# Patient Record
Sex: FEMALE | Race: WHITE | URBAN - METROPOLITAN AREA
[De-identification: names, ages, dates, MRNs, and addresses within clinical notes are randomized per-mention and may not be internally consistent; named-entity substitution may affect disease eponyms.]

---

## 2017-05-31 ENCOUNTER — OFFICE VISIT (OUTPATIENT)
Dept: URGENT CARE | Facility: URGENT CARE | Age: 52
End: 2017-05-31
Payer: COMMERCIAL

## 2017-05-31 VITALS
OXYGEN SATURATION: 94 % | DIASTOLIC BLOOD PRESSURE: 73 MMHG | WEIGHT: 120 LBS | TEMPERATURE: 98.1 F | HEART RATE: 57 BPM | SYSTOLIC BLOOD PRESSURE: 106 MMHG

## 2017-05-31 DIAGNOSIS — R07.0 THROAT PAIN: Primary | ICD-10-CM

## 2017-05-31 LAB
DEPRECATED S PYO AG THROAT QL EIA: NORMAL
MICRO REPORT STATUS: NORMAL
SPECIMEN SOURCE: NORMAL

## 2017-05-31 PROCEDURE — 87081 CULTURE SCREEN ONLY: CPT | Performed by: NURSE PRACTITIONER

## 2017-05-31 PROCEDURE — 99213 OFFICE O/P EST LOW 20 MIN: CPT | Performed by: NURSE PRACTITIONER

## 2017-05-31 PROCEDURE — 87880 STREP A ASSAY W/OPTIC: CPT | Performed by: NURSE PRACTITIONER

## 2017-05-31 NOTE — NURSING NOTE
Chief Complaint   Patient presents with     Pharyngitis     yesterday       Initial /73 (BP Location: Right arm, Patient Position: Chair, Cuff Size: Adult Regular)  Pulse 57  Temp 98.1  F (36.7  C) (Oral)  Wt 120 lb (54.4 kg)  SpO2 94% There is no height or weight on file to calculate BMI.  Medication Reconciliation: eitan Kimble, CMA

## 2017-05-31 NOTE — PROGRESS NOTES
SUBJECTIVE:                                                    Lisa Blevins is a 52 year old female who presents to clinic today for the following health issues:      RESPIRATORY SYMPTOMS      Duration: yesterday    Description  nasal congestion, rhinorrhea, sore throat, cough, chills, headache, fatigue/malaise, hoarse voice and myalgias    Severity: moderate    Accompanying signs and symptoms: None    History (predisposing factors):  none    Precipitating or alleviating factors: None    Therapies tried and outcome:  rest and fluids           Allergies   Allergen Reactions     Latex Swelling       No past medical history on file.      No current outpatient prescriptions on file prior to visit.  No current facility-administered medications on file prior to visit.     Social History   Substance Use Topics     Smoking status: Never Smoker     Smokeless tobacco: Not on file     Alcohol use Not on file       ROS:  Consitutional: As above  ENT: As above  Respiratory: As above    OBJECTIVE:  /73 (BP Location: Right arm, Patient Position: Chair, Cuff Size: Adult Regular)  Pulse 57  Temp 98.1  F (36.7  C) (Oral)  Wt 120 lb (54.4 kg)  SpO2 94%  GENERAL APPEARANCE: healthy, alert and no distress  EYES: conjunctiva clear  EARS:small cerumen.   Ear canals w/o erythema, TM's intact w/o erythema.    NOSE/MOUTH: Nose and mouth without ulcers, erythema or lesions  THROAT: mild erythema with slight tonsillar enlargement . And no exudates  NECK: supple, nontender, no lymphadenopathy  RESP: lungs clear to auscultation - no rales, rhonchi or wheezes  CV: regular rates and rhythm, normal S1 S2, no murmur noted  NEURO: awake, alert        Rapid Strep test: Negative    ASSESSMENT:     ICD-10-CM    1. Throat pain R07.0 Strep, Rapid Screen       PLAN:  I discussed lab results with the patient.  Lots of rest and fluids.  RTC if any worsening symptoms or if not improving.    Margarita Owen FNP-BC

## 2017-05-31 NOTE — MR AVS SNAPSHOT
"              After Visit Summary   2017    Lisa Blevins    MRN: [id number]           Patient Information     Date Of Birth          1965        Visit Information        Provider Department      2017 6:25 PM Margarita Owen NP [facility]        Today's Diagnoses     Throat pain    -  1       Follow-ups after your visit        Follow-up notes from your care team     Return if symptoms worsen or fail to improve.      Who to contact     If you have questions or need follow up information about today's clinic visit or your schedule please contact [facility] directly at [phone].  Normal or non-critical lab and imaging results will be communicated to you by Yoolinkhart, letter or phone within 4 business days after the clinic has received the results. If you do not hear from us within 7 days, please contact the clinic through Yoolinkhart or phone. If you have a critical or abnormal lab result, we will notify you by phone as soon as possible.  Submit refill requests through Indeed or call your pharmacy and they will forward the refill request to us. Please allow 3 business days for your refill to be completed.          Additional Information About Your Visit        MyChart Information     Indeed lets you send messages to your doctor, view your test results, renew your prescriptions, schedule appointments and more. To sign up, go to [email or web address] . Click on \"Log in\" on the left side of the screen, which will take you to the Welcome page. Then click on \"Sign up Now\" on the right side of the page.     You will be asked to enter the access code listed below, as well as some personal information. Please follow the directions to create your username and password.     Your access code is: [email or web address]  Expires: 2017  7:45 PM     Your access code will  in 90 days. If you need help or a new code, please call your [facility] or [phone].        Care " EveryWhere ID     This is your Care EveryWhere ID. This could be used by other organizations to access your [place] medical records  [id number]        Your Vitals Were     Pulse Temperature Pulse Oximetry             57 98.1  F (36.7  C) (Oral) 94%          Blood Pressure from Last 3 Encounters:   05/31/17 106/73   10/28/16 109/72    Weight from Last 3 Encounters:   05/31/17 120 lb (54.4 kg)   10/28/16 113 lb 12.8 oz (51.6 kg)              We Performed the Following     Strep, Rapid Screen        Primary Care Provider    None Specified       No primary provider on file.        Thank you!     Thank you for choosing [facility]  for your care. Our goal is always to provide you with excellent care. Hearing back from our patients is one way we can continue to improve our services. Please take a few minutes to complete the written survey that you may receive in the mail after your visit with us. Thank you!             Your Updated Medication List - Protect others around you: Learn how to safely use, store and throw away your medicines at www.disposemymeds.org.      Notice  As of 5/31/2017  7:45 PM    You have not been prescribed any medications.

## 2017-06-01 LAB
BACTERIA SPEC CULT: NORMAL
MICRO REPORT STATUS: NORMAL
SPECIMEN SOURCE: NORMAL